# Patient Record
Sex: MALE | Race: WHITE | NOT HISPANIC OR LATINO | URBAN - METROPOLITAN AREA
[De-identification: names, ages, dates, MRNs, and addresses within clinical notes are randomized per-mention and may not be internally consistent; named-entity substitution may affect disease eponyms.]

---

## 2024-04-25 ENCOUNTER — OFFICE VISIT (OUTPATIENT)
Dept: PULMONOLOGY | Facility: CLINIC | Age: 69
End: 2024-04-25
Payer: MEDICARE

## 2024-04-25 VITALS
WEIGHT: 240.94 LBS | HEART RATE: 67 BPM | HEIGHT: 69 IN | RESPIRATION RATE: 18 BRPM | DIASTOLIC BLOOD PRESSURE: 78 MMHG | SYSTOLIC BLOOD PRESSURE: 122 MMHG | BODY MASS INDEX: 35.69 KG/M2 | OXYGEN SATURATION: 97 %

## 2024-04-25 DIAGNOSIS — J30.0 CHRONIC VASOMOTOR RHINITIS: Primary | ICD-10-CM

## 2024-04-25 DIAGNOSIS — G47.33 OSA ON CPAP: ICD-10-CM

## 2024-04-25 PROBLEM — E11.9 CONTROLLED TYPE 2 DIABETES MELLITUS WITHOUT COMPLICATION, WITHOUT LONG-TERM CURRENT USE OF INSULIN: Status: ACTIVE | Noted: 2024-04-25

## 2024-04-25 PROBLEM — E03.8 OTHER SPECIFIED HYPOTHYROIDISM: Status: ACTIVE | Noted: 2021-11-02

## 2024-04-25 PROBLEM — Z96.652 S/P TOTAL KNEE ARTHROPLASTY, LEFT: Status: ACTIVE | Noted: 2021-11-02

## 2024-04-25 PROBLEM — M15.9 PRIMARY OSTEOARTHRITIS INVOLVING MULTIPLE JOINTS: Status: ACTIVE | Noted: 2018-01-11

## 2024-04-25 PROBLEM — E78.5 HYPERLIPIDEMIA ASSOCIATED WITH TYPE 2 DIABETES MELLITUS: Status: ACTIVE | Noted: 2024-04-25

## 2024-04-25 PROBLEM — E11.69 HYPERLIPIDEMIA ASSOCIATED WITH TYPE 2 DIABETES MELLITUS: Status: ACTIVE | Noted: 2024-04-25

## 2024-04-25 PROBLEM — J34.89 NASAL OBSTRUCTION: Status: ACTIVE | Noted: 2023-05-04

## 2024-04-25 PROBLEM — F32.0 CURRENT MILD EPISODE OF MAJOR DEPRESSIVE DISORDER WITHOUT PRIOR EPISODE: Status: ACTIVE | Noted: 2020-09-23

## 2024-04-25 PROBLEM — M15.0 PRIMARY OSTEOARTHRITIS INVOLVING MULTIPLE JOINTS: Status: ACTIVE | Noted: 2018-01-11

## 2024-04-25 PROCEDURE — 3008F BODY MASS INDEX DOCD: CPT | Mod: CPTII,S$GLB,, | Performed by: INTERNAL MEDICINE

## 2024-04-25 PROCEDURE — 4010F ACE/ARB THERAPY RXD/TAKEN: CPT | Mod: CPTII,S$GLB,, | Performed by: INTERNAL MEDICINE

## 2024-04-25 PROCEDURE — 3074F SYST BP LT 130 MM HG: CPT | Mod: CPTII,S$GLB,, | Performed by: INTERNAL MEDICINE

## 2024-04-25 PROCEDURE — 1100F PTFALLS ASSESS-DOCD GE2>/YR: CPT | Mod: CPTII,S$GLB,, | Performed by: INTERNAL MEDICINE

## 2024-04-25 PROCEDURE — 99204 OFFICE O/P NEW MOD 45 MIN: CPT | Mod: S$GLB,,, | Performed by: INTERNAL MEDICINE

## 2024-04-25 PROCEDURE — 1159F MED LIST DOCD IN RCRD: CPT | Mod: CPTII,S$GLB,, | Performed by: INTERNAL MEDICINE

## 2024-04-25 PROCEDURE — 3288F FALL RISK ASSESSMENT DOCD: CPT | Mod: CPTII,S$GLB,, | Performed by: INTERNAL MEDICINE

## 2024-04-25 PROCEDURE — 99999 PR PBB SHADOW E&M-NEW PATIENT-LVL III: CPT | Mod: PBBFAC,,, | Performed by: INTERNAL MEDICINE

## 2024-04-25 PROCEDURE — 3052F HG A1C>EQUAL 8.0%<EQUAL 9.0%: CPT | Mod: CPTII,S$GLB,, | Performed by: INTERNAL MEDICINE

## 2024-04-25 PROCEDURE — 1160F RVW MEDS BY RX/DR IN RCRD: CPT | Mod: CPTII,S$GLB,, | Performed by: INTERNAL MEDICINE

## 2024-04-25 PROCEDURE — 3078F DIAST BP <80 MM HG: CPT | Mod: CPTII,S$GLB,, | Performed by: INTERNAL MEDICINE

## 2024-04-25 PROCEDURE — 1126F AMNT PAIN NOTED NONE PRSNT: CPT | Mod: CPTII,S$GLB,, | Performed by: INTERNAL MEDICINE

## 2024-04-25 RX ORDER — PANTOPRAZOLE SODIUM 40 MG/1
1 TABLET, DELAYED RELEASE ORAL EVERY MORNING
COMMUNITY
Start: 2024-04-15

## 2024-04-25 RX ORDER — LEVOTHYROXINE SODIUM 75 UG/1
1 TABLET ORAL EVERY MORNING
COMMUNITY
Start: 2024-04-15

## 2024-04-25 RX ORDER — GLIMEPIRIDE 4 MG/1
1 TABLET ORAL EVERY MORNING
COMMUNITY
Start: 2024-04-15

## 2024-04-25 RX ORDER — IPRATROPIUM BROMIDE 21 UG/1
2 SPRAY, METERED NASAL 3 TIMES DAILY
Start: 2024-04-25

## 2024-04-25 RX ORDER — LOSARTAN POTASSIUM 100 MG/1
1 TABLET ORAL DAILY
COMMUNITY
Start: 2024-04-15

## 2024-04-25 RX ORDER — PIOGLITAZONEHYDROCHLORIDE 30 MG/1
1 TABLET ORAL EVERY MORNING
COMMUNITY
Start: 2024-04-15

## 2024-04-25 RX ORDER — CELECOXIB 200 MG/1
1 CAPSULE ORAL EVERY MORNING
COMMUNITY
Start: 2024-04-15

## 2024-04-25 RX ORDER — ATORVASTATIN CALCIUM 40 MG/1
1 TABLET, FILM COATED ORAL DAILY
COMMUNITY
Start: 2024-04-15

## 2024-04-25 NOTE — PROGRESS NOTES
Subjective:     Patient ID: Robert Claros is a 68 y.o. male.    Chief Complaint:      HPI 68 y.o. seen today for Obstructive sleep apnea syndrome ,Insomnia, Restless sleeper (Legs?),  Fatigue, Snoring , Daytime hypersomulence     Obstructive Sleep Apnea on Continuous Positive Airway Pressure: He has c/o of vasomotor rhinitis that interferes with Continuous Positive Airway Pressure treatment.  Patient is using not CPAP as prescribed and not benefiting from therapy. Patient has complaints of passing out during the day due to sleepiness  Wants to try to use CPAP again        Past Medical History:   Diagnosis Date    High blood pressure     High cholesterol     Sleep apnea, unspecified     Type 2 diabetes mellitus with unspecified diabetic retinopathy without macular edema      Past Surgical History:   Procedure Laterality Date    CHOLECYSTECTOMY  2023    NECK SURGERY       Review of patient's allergies indicates:  No Known Allergies  Current Outpatient Medications on File Prior to Visit   Medication Sig Dispense Refill    atorvastatin (LIPITOR) 40 MG tablet Take 1 tablet by mouth once daily.      celecoxib (CELEBREX) 200 MG capsule Take 1 capsule by mouth every morning.      glimepiride (AMARYL) 4 MG tablet Take 1 tablet by mouth every morning.      levothyroxine (SYNTHROID) 75 MCG tablet Take 1 tablet by mouth every morning.      losartan (COZAAR) 100 MG tablet Take 1 tablet by mouth once daily.      pantoprazole (PROTONIX) 40 MG tablet Take 1 tablet by mouth every morning.      pioglitazone (ACTOS) 30 MG tablet Take 1 tablet by mouth every morning.       No current facility-administered medications on file prior to visit.     Social History     Socioeconomic History    Marital status:    Tobacco Use    Smoking status: Former     Average packs/day: 1 pack/day for 7.0 years (7.0 ttl pk-yrs)     Types: Cigarettes     Start date: 1980    Smokeless tobacco: Never   Substance and Sexual Activity    Alcohol use:  "Not Currently    Drug use: Not Currently    Sexual activity: Yes     No family history on file.    Review of Systems   Constitutional:  Positive for fatigue.   HENT:  Positive for rhinorrhea.    Respiratory:  Positive for apnea and asthma nighttime symptoms.    Cardiovascular: Negative.    Genitourinary: Negative.    Endocrine: endocrine negative    Musculoskeletal: Negative.    Skin: Negative.    Gastrointestinal: Negative.    Neurological: Negative.    Psychiatric/Behavioral:  Positive for sleep disturbance.        Objective:      /78   Pulse 67   Resp 18   Ht 5' 9" (1.753 m)   Wt 109.3 kg (240 lb 15.4 oz)   SpO2 97%   BMI 35.58 kg/m²   Physical Exam  Vitals and nursing note reviewed.   Constitutional:       Appearance: He is well-developed. He is obese.   HENT:      Head: Normocephalic and atraumatic.      Nose: Nose normal.   Eyes:      Conjunctiva/sclera: Conjunctivae normal.      Pupils: Pupils are equal, round, and reactive to light.   Neck:      Thyroid: No thyromegaly.      Vascular: No JVD.      Trachea: No tracheal deviation.   Cardiovascular:      Rate and Rhythm: Normal rate and regular rhythm.      Heart sounds: Normal heart sounds.   Pulmonary:      Effort: Pulmonary effort is normal.      Breath sounds: Normal breath sounds.   Abdominal:      Palpations: Abdomen is soft.   Musculoskeletal:         General: Normal range of motion.      Cervical back: Neck supple.   Lymphadenopathy:      Cervical: No cervical adenopathy.   Skin:     General: Skin is warm and dry.   Neurological:      Mental Status: He is alert and oriented to person, place, and time.   Psychiatric:         Mood and Affect: Mood normal.         Behavior: Behavior normal.       Personal Diagnostic Review  Polysomnogram: Sleep studies:-[facility] sleep lab. 10/27/2021. Analyzed time 9 hours 52 minutes. Height 5 foot 10 weight 225 pounds BMI 32.3. Study showed apnea-hypopnea index 66 events per hour. Lowest oxygen " "saturation 65%. Average oxygen saturation 92%. 20% of study time oxygen was less than 90%.          4/25/2024     3:07 PM   Pulmonary Studies Review   SpO2 97 %   Height 5' 9" (1.753 m)   Weight 109.3 kg (240 lb 15.4 oz)   BMI (Calculated) 35.6   Predicted Distance 322.3   Predicted Distance Meters (Calculated) 489.31 meters       No image results found.      Office Spirometry Results:         4/25/2024     3:07 PM   Pulmonary Function Tests   SpO2 97 %   Height 5' 9" (1.753 m)   Weight 109.3 kg (240 lb 15.4 oz)   BMI (Calculated) 35.6         4/25/2024     3:07 PM   Pulmonary Studies Review   SpO2 97 %   Height 5' 9" (1.753 m)   Weight 109.3 kg (240 lb 15.4 oz)   BMI (Calculated) 35.6   Predicted Distance 322.3   Predicted Distance Meters (Calculated) 489.31 meters           No results found for this or any previous visit (from the past 336 hour(s)).    Assessment:            Chronic vasomotor rhinitis  -     ipratropium (ATROVENT) 21 mcg (0.03 %) nasal spray; 2 sprays by Each Nostril route 3 (three) times daily.    AMAYA on CPAP  -     CPAP/BIPAP SUPPLIES  -     CPAP FOR HOME USE  -     [facility] PATIENT ENTERED CPAP USAGE; Standing          Outpatient Encounter Medications as of 4/25/2024   Medication Sig Dispense Refill    atorvastatin (LIPITOR) 40 MG tablet Take 1 tablet by mouth once daily.      celecoxib (CELEBREX) 200 MG capsule Take 1 capsule by mouth every morning.      glimepiride (AMARYL) 4 MG tablet Take 1 tablet by mouth every morning.      levothyroxine (SYNTHROID) 75 MCG tablet Take 1 tablet by mouth every morning.      losartan (COZAAR) 100 MG tablet Take 1 tablet by mouth once daily.      pantoprazole (PROTONIX) 40 MG tablet Take 1 tablet by mouth every morning.      pioglitazone (ACTOS) 30 MG tablet Take 1 tablet by mouth every morning.      ipratropium (ATROVENT) 21 mcg (0.03 %) nasal spray 2 sprays by Each Nostril route 3 (three) times daily.       No facility-administered encounter medications " on file as of 2024.     Plan:       Requested Prescriptions     Signed Prescriptions Disp Refills    ipratropium (ATROVENT) 21 mcg (0.03 %) nasal spray       Si sprays by Each Nostril route 3 (three) times daily.     Problem List Items Addressed This Visit    None  Visit Diagnoses       Chronic vasomotor rhinitis    -  Primary    Relevant Medications    ipratropium (ATROVENT) 21 mcg (0.03 %) nasal spray    AMAYA on CPAP        Relevant Orders    CPAP/BIPAP SUPPLIES    CPAP FOR HOME USE    [facility] [place]               Follow up in about 6 weeks (around 2024) for CPAP download - day of visit.    MEDICAL DECISION MAKING: Moderate to high complexity.  Overall, the multiple problems listed are of moderate to high severity that may impact quality of life and activities of daily living. Side effects of medications, treatment plan as well as options and alternatives reviewed and discussed with patient. There was counseling of patient concerning these issues.    Total time spent in counseling and coordination of care - 45  minutes of total time spent on the encounter, which includes face to face time and non-face to face time preparing to see the patient (eg, review of tests), Obtaining and/or reviewing separately obtained history, Documenting clinical information in the electronic or other health record, Independently interpreting results (not separately reported) and communicating results to the patient/family/caregiver, or Care coordination (not separately reported).    Time was used in discussion of prognosis, risks, benefits of treatment, instructions and compliance with regimen . Discussion with other physicians and/or health care providers - home health or for use of durable medical equipment (oxygen, nebulizers, CPAP, BiPAP) occurred.

## 2024-04-29 ENCOUNTER — TELEPHONE (OUTPATIENT)
Dept: PULMONOLOGY | Facility: CLINIC | Age: 69
End: 2024-04-29
Payer: MEDICARE

## 2024-04-30 ENCOUNTER — TELEPHONE (OUTPATIENT)
Dept: PULMONOLOGY | Facility: CLINIC | Age: 69
End: 2024-04-30
Payer: MEDICARE

## 2024-04-30 NOTE — TELEPHONE ENCOUNTER
Spoke to pt.  Informed pt that I do have the form and I faxed it to Dr. Whiting yesterday as requested.

## 2024-04-30 NOTE — TELEPHONE ENCOUNTER
----- Message from Charlene Morris sent at 4/30/2024  3:03 PM CDT -----  Regarding: Patient Advice                Name of Who is Calling:  Robert Claros / Patient's Wife      Who Left The Message:  Robert Claros / Patient's Wife        What is the request in detail:     Patient's wife called requesting the status of the paperwork she dropped off on last Friday 04/26/2024 for Dr. Roverto Benavides MD to complete and sign.  Please further advise.   Thank you      Reply by MY [facility]: NO      Preferred Call Back :  [phone] ([phone])

## 2024-05-03 NOTE — PATIENT INSTRUCTIONS
"Continuous Positive Airway Pressure Machine Cleaning    One of the most important factors in maintaining CPAP compliance is taking proper care of your CPAP equipment. In order to have successful CPAP therapy, you must be willing to make your treatment a priority in your life, and that means regularly cleaning and maintaining your CPAP equipment. Fortunately, taking proper care of your equipment is pretty easy, and not very time consuming. With a little adjustment to your regular morning routine, your device and accessories will be working at 100% efficiency to get you that much needed sleep you've been longing for.    One of the most frequent questions we get asked is "how often do I need to clean my CPAP equipment?" .    CPAP Humidifier Cleaning and Replacement:  Nearly all current CPAP machines now come with a heated humidification system that helps cut down on morning dry mouth as well as keeping your nasal turbinates from drying out and becoming irritated and inflamed. However, the humidification chamber needs to be cleaned out daily to prevent bacteria build-up as well as calcification. Recommended routine:    Remove chamber from humidifier carefully so water doesn't enter your CPAP machine.    Open chamber and wash with warm, soapy water.    Rinse well with water and allow to dry on a clean cloth or paper towel out in direct sunlight.    Fill with distilled or sterile water (obtained at any grocery store) Do not use tap water as it may contain minerals and chemicals that can damage components of the machine. It is also not recommended to use filtered water (i.e. through a Suzanne filter) for the same reasons.    Once a week the humidifier chamber should be soaked in a solution of 1 part white vinegar 3 parts water for approximately 15-20 minutes before rinsing thoroughly with distilled water.    Some humidifier chambers are  safe, but make sure to check your CPAP machine's manual before cleaning in a " .    Humidifier chambers should be replaced as needed.    CPAP Mask Cleaning and Replacement:  Most CPAP mask cushions are made of silicone, a gentle, non-irritating material. However, while silicone is a very comfortable material for masks, it doesn't have a very long lifespan, and without proper care can breakdown quicker than expected. The oil from your skin interacts with the silicone mask and causes breakdown and stiffness. Therefore, cleaning your CPAP mask is crucial in making it efficient as possible. Here's some tips on CPAP mask cleaning and replacement:    Wash mask daily with warm water and mild, non-fragrant soap or purchase CPAP mask specific wipes and detergents. You can use the same type of mild basic facial soap that you use on your face.    Rinse with water and allow to air dry on a clean cloth or paper towel out of direct sunlight.    Before using mask at night, wash your face thoroughly and don't use facial moisturizers. Facial oils and moisturizers can breakdown the silicone faster.    Once a week soak mask in solution of 1 part white vinegar 3 parts water before rinsing in distilled water.    Headgear and chinstraps should be washed as needed by hand using warm soapy water, rinsed well, and air dried. Do not place headgear or chinstraps in washing machine or dryer.  For replacement schedules of CPAP masks you should check both your 's recommendations and your insurance allowance. However, for most masks it is recommended that you  replace the cushions 1-2 times per month, and the mask every 3-6 months.    CPAP tubing should be cleaned weekly in a sink of warm, soapy water, rinsed well, and left to hang-dry out of direct sunlight.    CPAP Filters Cleaning and Replacement  Your filters are located near the back of the CPAP machine where the device draws air from the room that it compresses to your pressure settings. Nearly all CPAP machines have a disposable white paper  filter and some have an additional non-disposable grey filter as well. Here are some cleaning tips for your CPAP filters:    You should clean the grey non-disposable filter at least on a weekly basis. You may have to clean it more regularly if you have pets, smoke inside your house, or if your home is especially ying.    Rinse grey filters with water and allow to dry before placing back into your machine.    The grey re-usable filters should be replaced when it begins to look worn or after 6 months.    Replace disposable white paper filters monthly or more frequently if it appears dingy or dirty.    Your CPAP machine itself does not need to be cleaned but you may want to dust it down with a slightly damp cloth as desired.    General CPAP Maintenance & CPAP Cleaning Tips  Make your CPAP equipment cleaning part of your morning routine, allowing the equipment ample time to dry during the day.    Keep machine and accessories out of direct sunlight to avoid damaging them.    Never use bleach to clean accessories.    Other machine accessories such as power cords and data cards may need to be replaced due to equipment malfunctions.    Place machine on a level surface away from objects such as curtains that may interfere with the air intake.    Always use distilled or sterile water when cleaning components.    Keep track of when you should order replacement parts for your mask and accessories so that you always get the most out of your therapy.    With these simple tips on cleaning and maintaining your CPAP device and accessories, you will assuredly have a much better CPAP therapy experience.    There are a number of machines advertised that clean Continuous Positive Airway Pressure equipment. For most patients this is not necessary. If you have a history of chronic sinus or lung infections this type of cleaning device may be helpful. Unfortunately, at this time most insurance companies and Medicare are not paying for  these devices.

## 2024-05-08 ENCOUNTER — TELEPHONE (OUTPATIENT)
Dept: PULMONOLOGY | Facility: CLINIC | Age: 69
End: 2024-05-08
Payer: MEDICARE

## 2024-05-08 NOTE — TELEPHONE ENCOUNTER
Spoke to pt wife concerning lots of issues.  She is very upset about a lot of different things originating with the fall her  had a work.  She is upset that his cpap supply order did not get authorized fast enough with [facility] and she is blaming Zara.  She is also upset about a form not being faxed to the pt's PCP fast enough.  She is demanding to have a copy of the cover sheet and fax transmittal confirmation which is not available.  I did inform her on 4/30/24 that the fax was sent 4/29/24 per her request and the recipient did receive it per pt wife.   She stated that she is coming in for her own appt with E. Lejeune today and will be looking for who to blame for all these issues when she comes in.   Pt wife was swearing, very belligerent and rude.

## 2024-05-08 NOTE — TELEPHONE ENCOUNTER
----- Message from Ameena Zapien sent at 5/8/2024 10:16 AM CDT -----  Contact: Brisa/wife  Brisa/wife would like a call back at [phone] in regards to needing to speak with nurse about a authorization that was supposed to be sent over to [facility] for patient cpap supplies. They have been waiting for about 2 weeks.  Thanks   Am

## 2025-01-13 ENCOUNTER — OFFICE VISIT (OUTPATIENT)
Dept: PULMONOLOGY | Facility: CLINIC | Age: 70
End: 2025-01-13
Payer: MEDICARE

## 2025-01-13 VITALS
RESPIRATION RATE: 18 BRPM | HEART RATE: 90 BPM | WEIGHT: 215.81 LBS | SYSTOLIC BLOOD PRESSURE: 126 MMHG | OXYGEN SATURATION: 99 % | DIASTOLIC BLOOD PRESSURE: 70 MMHG | HEIGHT: 68 IN | BODY MASS INDEX: 32.71 KG/M2

## 2025-01-13 DIAGNOSIS — E66.811 OBESITY, CLASS I, BMI 30-34.9: ICD-10-CM

## 2025-01-13 DIAGNOSIS — G47.33 OSA (OBSTRUCTIVE SLEEP APNEA): Primary | ICD-10-CM

## 2025-01-13 PROCEDURE — 3074F SYST BP LT 130 MM HG: CPT | Mod: CPTII,S$GLB,, | Performed by: NURSE PRACTITIONER

## 2025-01-13 PROCEDURE — 3008F BODY MASS INDEX DOCD: CPT | Mod: CPTII,S$GLB,, | Performed by: NURSE PRACTITIONER

## 2025-01-13 PROCEDURE — 1126F AMNT PAIN NOTED NONE PRSNT: CPT | Mod: CPTII,S$GLB,, | Performed by: NURSE PRACTITIONER

## 2025-01-13 PROCEDURE — 3078F DIAST BP <80 MM HG: CPT | Mod: CPTII,S$GLB,, | Performed by: NURSE PRACTITIONER

## 2025-01-13 PROCEDURE — 99999 PR PBB SHADOW E&M-EST. PATIENT-LVL III: CPT | Mod: PBBFAC,,, | Performed by: NURSE PRACTITIONER

## 2025-01-13 PROCEDURE — 99213 OFFICE O/P EST LOW 20 MIN: CPT | Mod: S$GLB,,, | Performed by: NURSE PRACTITIONER

## 2025-01-13 PROCEDURE — 3288F FALL RISK ASSESSMENT DOCD: CPT | Mod: CPTII,S$GLB,, | Performed by: NURSE PRACTITIONER

## 2025-01-13 PROCEDURE — 1159F MED LIST DOCD IN RCRD: CPT | Mod: CPTII,S$GLB,, | Performed by: NURSE PRACTITIONER

## 2025-01-13 PROCEDURE — 1160F RVW MEDS BY RX/DR IN RCRD: CPT | Mod: CPTII,S$GLB,, | Performed by: NURSE PRACTITIONER

## 2025-01-13 PROCEDURE — 1101F PT FALLS ASSESS-DOCD LE1/YR: CPT | Mod: CPTII,S$GLB,, | Performed by: NURSE PRACTITIONER

## 2025-01-13 RX ORDER — SEMAGLUTIDE 0.68 MG/ML
0.5 INJECTION, SOLUTION SUBCUTANEOUS
COMMUNITY

## 2025-01-13 NOTE — PROGRESS NOTES
"Subjective:      Patient ID: Robert Claros is a 69 y.o. male.    Chief Complaint: Sleep Apnea    HPI  Presents for sleep apnea on AutoPAP therapy. Patient states improved symptoms with use of AutoPAP. Sleeping more soundly. Waking up feeling more refreshed. Improved daytime sleepiness. Patient states he is benefiting from use of the AutoPAP.     Patient Active Problem List   Diagnosis    S/P total knee arthroplasty, left    Primary osteoarthritis involving multiple joints    Other specified hypothyroidism    AMAYA (obstructive sleep apnea)    Nasal obstruction    Hypertension associated with type 2 diabetes mellitus    Hyperlipidemia associated with type 2 diabetes mellitus    Current mild episode of major depressive disorder without prior episode    Controlled type 2 diabetes mellitus without complication, without long-term current use of insulin     /70   Pulse 90   Resp 18   Ht 5' 8" (1.727 m)   Wt 97.9 kg (215 lb 13.3 oz)   SpO2 99%   BMI 32.82 kg/m²   Body mass index is 32.82 kg/m².    Review of Systems   Constitutional: Negative.    HENT: Negative.     Respiratory: Negative.     Cardiovascular: Negative.    Musculoskeletal: Negative.    Gastrointestinal: Negative.    Neurological: Negative.    Psychiatric/Behavioral: Negative.       Objective:      Physical Exam  Constitutional:       Appearance: Normal appearance.   HENT:      Head: Normocephalic and atraumatic.      Nose: Nose normal.   Cardiovascular:      Rate and Rhythm: Normal rate and regular rhythm.   Pulmonary:      Effort: Pulmonary effort is normal.      Breath sounds: Normal breath sounds.   Abdominal:      Palpations: Abdomen is soft.      Tenderness: There is no abdominal tenderness.   Musculoskeletal:         General: Normal range of motion.      Cervical back: Normal range of motion and neck supple.   Skin:     General: Skin is warm and dry.   Neurological:      General: No focal deficit present.      Mental Status: He is alert and " oriented to person, place, and time.   Psychiatric:         Mood and Affect: Mood normal.         Behavior: Behavior normal.       Personal Diagnostic Review      1/13/2025     9:10 AM   EPWORTH SLEEPINESS SCALE   Sitting and reading 0   Watching TV 0   Sitting, inactive in a public place (e.g. a theatre or a meeting) 0   As a passenger in a car for an hour without a break 0   Lying down to rest in the afternoon when circumstances permit 0   Sitting and talking to someone 0   Sitting quietly after a lunch without alcohol 0   In a car, while stopped for a few minutes in traffic 0   Total score 0      Autopap download: Patient wears on average 8 hrs and 37 minutes. Greater than 4 hrs 100 % of the time. 90% percentile pressure 9 with AHI 10.8 events/hr   Large leak nightly.    Assessment:       1. AMAYA (obstructive sleep apnea)    2. Obesity, Class I, BMI 30-34.9        Outpatient Encounter Medications as of 1/13/2025   Medication Sig Dispense Refill    atorvastatin (LIPITOR) 40 MG tablet Take 1 tablet by mouth once daily.      celecoxib (CELEBREX) 200 MG capsule Take 1 capsule by mouth every morning.      glimepiride (AMARYL) 4 MG tablet Take 1 tablet by mouth every morning.      ipratropium (ATROVENT) 21 mcg (0.03 %) nasal spray 2 sprays by Each Nostril route 3 (three) times daily.      levothyroxine (SYNTHROID) 75 MCG tablet Take 1 tablet by mouth every morning.      losartan (COZAAR) 100 MG tablet Take 1 tablet by mouth once daily.      pantoprazole (PROTONIX) 40 MG tablet Take 1 tablet by mouth every morning.      pioglitazone (ACTOS) 30 MG tablet Take 1 tablet by mouth every morning.      semaglutide (OZEMPIC) 0.25 mg or 0.5 mg (2 mg/3 mL) pen injector Inject 0.5 mg into the skin every 7 days.       No facility-administered encounter medications on file as of 1/13/2025.     Orders Placed This Encounter   Procedures    CPAP/BIPAP SUPPLIES     02 solutions     Order Specific Question:   Length of need (1-99 months):      Answer:   99     Order Specific Question:   Choose ONE mask type and its corresponding cushions and/or pillows:     Answer:    Full Face Mask, 1 per 90 days:  Full Face Cushion, (3 per 90 days)     Order Specific Question:   Choose EITHER Heated or Non-Heated Tubjing     Answer:    Non-Heated Tubing, 1 per 90 days     Order Specific Question:   All other supplies as needed as listed below:     Answer:    Headgear, 1 per 180 days     Order Specific Question:   All other supplies as needed as listed below:     Answer:    Disposable Filter, 6 per 90 days     Order Specific Question:   All other supplies as needed as listed below:     Answer:    Non-Disposable Filter, 1 per 180 days     Order Specific Question:   All other supplies as needed as listed below:     Answer:    Humidifier Chamber, 1 per 180 days     Plan:     Large leak in mask. AHI 10/hr. Thick beard. Trim down beard. Mask check on machine before sleep.  Benefiting from use. Well rested. Dry mouth due to mask leak  Medical equipment [employer].     1. AMAYA (obstructive sleep apnea)  Overview:  Last Assessment & Plan:    Formatting of this note might be different from the original.   History & Physical    Nocturnal CPAP.   Discharge Summary       Follow-up Continue nocturnal CPAP    Orders:  -     CPAP/BIPAP SUPPLIES    2. Obesity, Class I, BMI 30-34.9    Weight loss and exercise to improve overall health.                      Elizabeth LeJeune, ACNP, ANP

## 2025-08-25 ENCOUNTER — LAB VISIT (OUTPATIENT)
Dept: LAB | Facility: HOSPITAL | Age: 70
End: 2025-08-25
Payer: MEDICARE

## 2025-08-25 ENCOUNTER — OFFICE VISIT (OUTPATIENT)
Dept: ENDOCRINOLOGY | Facility: CLINIC | Age: 70
End: 2025-08-25
Payer: MEDICARE

## 2025-08-25 ENCOUNTER — TELEPHONE (OUTPATIENT)
Dept: ENDOCRINOLOGY | Facility: CLINIC | Age: 70
End: 2025-08-25

## 2025-08-25 VITALS
DIASTOLIC BLOOD PRESSURE: 60 MMHG | WEIGHT: 202.81 LBS | SYSTOLIC BLOOD PRESSURE: 122 MMHG | HEIGHT: 68 IN | HEART RATE: 63 BPM | BODY MASS INDEX: 30.74 KG/M2

## 2025-08-25 DIAGNOSIS — E11.9 TYPE 2 DIABETES MELLITUS WITHOUT COMPLICATION, WITHOUT LONG-TERM CURRENT USE OF INSULIN: Primary | ICD-10-CM

## 2025-08-25 DIAGNOSIS — E11.59 HYPERTENSION ASSOCIATED WITH TYPE 2 DIABETES MELLITUS: ICD-10-CM

## 2025-08-25 DIAGNOSIS — R74.8 ELEVATED ALKALINE PHOSPHATASE LEVEL: ICD-10-CM

## 2025-08-25 DIAGNOSIS — E78.5 HYPERLIPIDEMIA ASSOCIATED WITH TYPE 2 DIABETES MELLITUS: ICD-10-CM

## 2025-08-25 DIAGNOSIS — E11.69 HYPERLIPIDEMIA ASSOCIATED WITH TYPE 2 DIABETES MELLITUS: ICD-10-CM

## 2025-08-25 DIAGNOSIS — I15.2 HYPERTENSION ASSOCIATED WITH TYPE 2 DIABETES MELLITUS: ICD-10-CM

## 2025-08-25 PROCEDURE — 84075 ASSAY ALKALINE PHOSPHATASE: CPT

## 2025-08-25 PROCEDURE — 1159F MED LIST DOCD IN RCRD: CPT | Mod: CPTII,S$GLB,, | Performed by: NURSE PRACTITIONER

## 2025-08-25 PROCEDURE — 3078F DIAST BP <80 MM HG: CPT | Mod: CPTII,S$GLB,, | Performed by: NURSE PRACTITIONER

## 2025-08-25 PROCEDURE — 3074F SYST BP LT 130 MM HG: CPT | Mod: CPTII,S$GLB,, | Performed by: NURSE PRACTITIONER

## 2025-08-25 PROCEDURE — 3044F HG A1C LEVEL LT 7.0%: CPT | Mod: CPTII,S$GLB,, | Performed by: NURSE PRACTITIONER

## 2025-08-25 PROCEDURE — 3288F FALL RISK ASSESSMENT DOCD: CPT | Mod: CPTII,S$GLB,, | Performed by: NURSE PRACTITIONER

## 2025-08-25 PROCEDURE — 1101F PT FALLS ASSESS-DOCD LE1/YR: CPT | Mod: CPTII,S$GLB,, | Performed by: NURSE PRACTITIONER

## 2025-08-25 PROCEDURE — 3008F BODY MASS INDEX DOCD: CPT | Mod: CPTII,S$GLB,, | Performed by: NURSE PRACTITIONER

## 2025-08-25 PROCEDURE — 36415 COLL VENOUS BLD VENIPUNCTURE: CPT | Mod: PO

## 2025-08-25 PROCEDURE — 1160F RVW MEDS BY RX/DR IN RCRD: CPT | Mod: CPTII,S$GLB,, | Performed by: NURSE PRACTITIONER

## 2025-08-25 PROCEDURE — G2211 COMPLEX E/M VISIT ADD ON: HCPCS | Mod: S$GLB,,, | Performed by: NURSE PRACTITIONER

## 2025-08-25 PROCEDURE — 4010F ACE/ARB THERAPY RXD/TAKEN: CPT | Mod: CPTII,S$GLB,, | Performed by: NURSE PRACTITIONER

## 2025-08-25 PROCEDURE — 99204 OFFICE O/P NEW MOD 45 MIN: CPT | Mod: S$GLB,,, | Performed by: NURSE PRACTITIONER

## 2025-08-25 PROCEDURE — 99999 PR PBB SHADOW E&M-EST. PATIENT-LVL III: CPT | Mod: PBBFAC,,, | Performed by: NURSE PRACTITIONER

## 2025-08-28 ENCOUNTER — PATIENT MESSAGE (OUTPATIENT)
Dept: ENDOCRINOLOGY | Facility: CLINIC | Age: 70
End: 2025-08-28
Payer: MEDICARE

## 2025-08-29 ENCOUNTER — TELEPHONE (OUTPATIENT)
Dept: ENDOCRINOLOGY | Facility: CLINIC | Age: 70
End: 2025-08-29
Payer: MEDICARE

## 2025-08-29 DIAGNOSIS — R74.8 ELEVATED SERUM ALKALINE PHOSPHATASE LEVEL: Primary | ICD-10-CM

## 2025-08-29 LAB — W ALKALINE PHOSPHATASE, BONE: 22.2 UG/L

## 2025-09-05 ENCOUNTER — TELEPHONE (OUTPATIENT)
Dept: ENDOCRINOLOGY | Facility: CLINIC | Age: 70
End: 2025-09-05
Payer: MEDICARE